# Patient Record
(demographics unavailable — no encounter records)

---

## 2024-12-20 NOTE — DISCUSSION/SUMMARY
[FreeTextEntry1] : 34 year old woman  who delivered  at 37.6 weeks with scheduled section and with known chronic HTN and history of PEC She has been on Nifedipine since  but not taking consistently; she has had URI symptoms this week and been taking Tylenol Cold and Sinus (has phenylnephrine)  Will monitor for 2 weeks off all meds FU at that time [EKG obtained to assist in diagnosis and management of assessed problem(s)] : EKG obtained to assist in diagnosis and management of assessed problem(s)

## 2024-12-20 NOTE — HISTORY OF PRESENT ILLNESS
[FreeTextEntry1] : 34 year old woman  who delivered 22 at 37.6 weeks  First pregnancy () -PEC after him, BP normalized Second pregnancy ()- GHTN- put on meds Procardia Continued after delivery- Nifedipine  EKG normal FH- parent with HTN  TTE 22 was normal EF 67%  She has been on Nifedipine since  but not taking consistently; she has had URI symptoms this week and been taking Tylenol Cold and Sinus (has phenylnephrine)

## 2025-03-25 NOTE — HISTORY OF PRESENT ILLNESS
[FreeTextEntry1] : 34 year old woman  who delivered 22 at 37.6 weeks now 12 weeks pregnant (EST[facility]AN 10/1/25) First pregnancy () -PEC after him, BP normalized Second pregnancy ()- GHTN- put on meds Procardia Continued after delivery- Nifedipine  EKG normal FH- parent with HTN  TTE 22 was normal EF 67%  Prior history of PEC currently pregnant: BP Stable Encouraged Patient to monitor BP at home, keep a log, and report results back to us for evaluation. Additionally, encouraged a heart-healthy diet and exercise as tolerated. EKG with no acute changes.

## 2025-03-25 NOTE — DISCUSSION/SUMMARY
[EKG obtained to assist in diagnosis and management of assessed problem(s)] : EKG obtained to assist in diagnosis and management of assessed problem(s) [FreeTextEntry1] : 34 year old woman  who delivered 22 at 37.6 weeks now 13 weeks pregnant (ESTEVAN 10/1/25) First pregnancy () -PEC after him, BP normalized Second pregnancy ()- GHTN- put on meds Procardia Continued after delivery- Nifedipine  EKG normal FH- parent with HTN  TTE 22 was normal EF 67% Will do 6 week followup  Start ASA when ok by OB

## 2025-05-06 NOTE — HISTORY OF PRESENT ILLNESS
[FreeTextEntry1] : 35 year old woman  who delivered 22 at 37.6 weeks now 19 weeks pregnant (EST[facility]AN 10/1/25) First pregnancy () -PEC after him, BP normalized Second pregnancy ()- GHTN- put on meds Procardia Continued after delivery- Nifedipine  EKG normal FH- parent with HTN  TTE 22 was normal EF 67%  Prior history of PEC currently pregnant:  On ASA BP at home started good but now higher in the 130s

## 2025-05-06 NOTE — DISCUSSION/SUMMARY
[EKG obtained to assist in diagnosis and management of assessed problem(s)] : EKG obtained to assist in diagnosis and management of assessed problem(s) [FreeTextEntry1] : 34 year old woman  who delivered 22 at 37.6 weeks now 18 weeks pregnant (ESTEVAN 10/1/25) First pregnancy () -PEC after him, BP normalized Second pregnancy ()- GHTN- put on meds Procardia Continued after delivery- Nifedipine  EKG normal FH- parent with HTN  TTE 22 was normal EF 67% Prior history of PEC currently pregnant:  On ASA BP at home started good but now higher in the 130s Start Nifedipine 30 mg daily  FU in 6 weeks

## 2025-07-16 NOTE — DISCUSSION/SUMMARY
[EKG obtained to assist in diagnosis and management of assessed problem(s)] : EKG obtained to assist in diagnosis and management of assessed problem(s) [FreeTextEntry1] : 35 year old woman  who delivered 22 at 37.6 weeks now 30 weeks pregnant (ESTEVAN 10/1/25) First pregnancy () -PEC after him, BP normalized Second pregnancy ()- GHTN- put on meds Procardia Continued after delivery- Nifedipine  EKG normal FH- parent with HTN  TTE 22 was normal EF 67% Prior history of PEC currently pregnant:  On ASA BP at home started good but now higher in the 130s Continue Nifedipine 30 mg daily  FU in 4 weeks

## 2025-07-16 NOTE — HISTORY OF PRESENT ILLNESS
[FreeTextEntry1] : 35 year old woman  who delivered 22 at 37.6 weeks now 29 weeks pregnant (ESTEVAN 10/1/25) Scheduled for section on 9/10/25 First pregnancy () -PEC after him, BP normalized Second pregnancy ()- GHTN- put on meds Procardia Continued after delivery- Nifedipine  EKG normal FH- parent with HTN  TTE 22 was normal EF 67%  Prior history of PEC currently pregnant:  On ASA BP at home started good but now higher in the 130s